# Patient Record
Sex: MALE | Race: WHITE | NOT HISPANIC OR LATINO | Employment: OTHER | URBAN - METROPOLITAN AREA
[De-identification: names, ages, dates, MRNs, and addresses within clinical notes are randomized per-mention and may not be internally consistent; named-entity substitution may affect disease eponyms.]

---

## 2022-08-25 ENCOUNTER — OFFICE VISIT (OUTPATIENT)
Dept: OPHTHALMOLOGY | Facility: CLINIC | Age: 72
End: 2022-08-25
Attending: OPHTHALMOLOGY
Payer: COMMERCIAL

## 2022-08-25 ENCOUNTER — TELEPHONE (OUTPATIENT)
Dept: OPHTHALMOLOGY | Facility: CLINIC | Age: 72
End: 2022-08-25

## 2022-08-25 DIAGNOSIS — H33.20 RETINAL DETACHMENT, UNSPECIFIED LATERALITY: Primary | ICD-10-CM

## 2022-08-25 DIAGNOSIS — H33.029 RHEGMATOGENOUS RETINAL DETACHMENT WITH MULTIPLE BREAKS: Primary | ICD-10-CM

## 2022-08-25 LAB — SARS-COV-2 RNA RESP QL NAA+PROBE: NEGATIVE

## 2022-08-25 PROCEDURE — 92250 FUNDUS PHOTOGRAPHY W/I&R: CPT | Performed by: OPHTHALMOLOGY

## 2022-08-25 PROCEDURE — 92134 CPTRZ OPH DX IMG PST SGM RTA: CPT | Performed by: OPHTHALMOLOGY

## 2022-08-25 PROCEDURE — G0463 HOSPITAL OUTPT CLINIC VISIT: HCPCS | Mod: 25

## 2022-08-25 PROCEDURE — U0005 INFEC AGEN DETEC AMPLI PROBE: HCPCS | Performed by: OPHTHALMOLOGY

## 2022-08-25 PROCEDURE — 99204 OFFICE O/P NEW MOD 45 MIN: CPT | Mod: GC | Performed by: STUDENT IN AN ORGANIZED HEALTH CARE EDUCATION/TRAINING PROGRAM

## 2022-08-25 PROCEDURE — 92134 CPTRZ OPH DX IMG PST SGM RTA: CPT | Mod: 26 | Performed by: STUDENT IN AN ORGANIZED HEALTH CARE EDUCATION/TRAINING PROGRAM

## 2022-08-25 PROCEDURE — 99207 FUNDUS PHOTOS OU (BOTH EYES): CPT | Mod: 26 | Performed by: STUDENT IN AN ORGANIZED HEALTH CARE EDUCATION/TRAINING PROGRAM

## 2022-08-25 RX ORDER — HYDROXYCHLOROQUINE SULFATE 200 MG/1
200 TABLET, FILM COATED ORAL DAILY
COMMUNITY

## 2022-08-25 ASSESSMENT — TONOMETRY
OD_IOP_MMHG: 15
IOP_METHOD: TONOPEN
OS_IOP_MMHG: 14

## 2022-08-25 ASSESSMENT — SLIT LAMP EXAM - LIDS
COMMENTS: NORMAL
COMMENTS: NORMAL

## 2022-08-25 ASSESSMENT — EXTERNAL EXAM - LEFT EYE: OS_EXAM: NORMAL

## 2022-08-25 ASSESSMENT — EXTERNAL EXAM - RIGHT EYE: OD_EXAM: NORMAL

## 2022-08-25 ASSESSMENT — CONF VISUAL FIELD
OD_SUPERIOR_NASAL_RESTRICTION: 1
OS_NORMAL: 1

## 2022-08-25 ASSESSMENT — VISUAL ACUITY
OS_PH_SC: 20/100
METHOD: SNELLEN - LINEAR
OD_SC: 20/25
OD_SC+: -1
OS_SC: 20/400

## 2022-08-25 ASSESSMENT — CUP TO DISC RATIO
OD_RATIO: 0.2
OS_RATIO: 0.2

## 2022-08-25 NOTE — TELEPHONE ENCOUNTER
Met with Kwaku to arrange an emergency surgery for tomorrow 08/26 with Dr. Qing Shaw. OR space is saved for Dr. Qing Shaw if we can arrange a ride for Kwaku and he will stay with his mother Peyton Calloway following surgery.     I will call Kwaku on cell phone provided [phone], once ride is confirmed. Kwaku gave permission to share necessary information to procure transport to and from surgery. Kwaku also provided address in [place] as well as his mother's home number where he'll be staying.     [address] [place] [address]    [phone] mother's home number

## 2022-08-25 NOTE — H&P (VIEW-ONLY)
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 Dr. 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   Left eye has cataract, vision stable. He needs glasses but uses the eye for near.     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 Avoided ER visit.   - Risk factors: prior HST after CE/IOL in 2020, myopia, lattice, pseudophakia. Denies trauma.   - Exam significant for RRD from 6:00 to 9:00 with a break at 10:00, 9:00 and 7:00; old tear with laser scars. OCT consistent. Mac is involved inferotemporally. Fovea still attached. [facility]    Plan  -plan for 25gPPV/EL/AFx/Gas + buckle   - COVID stat   - POD1, POW1, POM1 then [facility]up with [facility] thereafter    I discussed the risks, benefits, and alternatives of retinal detachment surgery with the patient.  I explained that with surgery there was approximately a 70 to 80 percent chance of success and that the surgery might fail due to formation of fibrosis or other postoperative problems.  I explained that if the surgery failed, additional surgeries might be needed. I explained that retinal detachments cause vision loss and that even with a successful result from the surgery, the vision might not return to its prior level.  I explained that if there were subsequent re-detachments, even more vision might be lost.  I  explained that with a gas bubble in the eye, a particular head position after surgery including face-down might be necessary for a week or more.  I also explained that airplane travel or high altitudes would have to be avoided for up to three months after surgery.      I explained that an oil bubble could be placed instead, and that it would not require such strict positioning or travel restrictions. However, an oil bubble would require further surgeries to be removed.    I explained that with a scleral buckle around the eye, double vision may develop after surgery.  This double vision usually resolves, but it might require either special glasses, further surgeries, or could even be intractable and permanent.  I explained there was a small chance I might have to remove their intraocular lens during the surgery.       I explained that this is a training facility, and residents or fellows might participate in the surgery under supervision.    The patient understood the risks, benefits, and alternatives, and elected to proceed.      # ERM, right eye  - NVS, mild    # Pseudophakia, right eye  - IOL good, PCO  - [facility]    # NS, left eye  # Myope  - BCVA closer to 20/20, effectively doing monovision with left eye for near work    # Lattice both eyes  Retina detachment precautions were discussed with the patient (presence or increased in flashes, floaters or a curtain in the visual field) and was asked to return if any of the those occur     RTC: Return Emergent RD repair.            ~~~~~~~~~~~~~~~~~~~~~~~~~~~~~~~~~~  My privilege to be part of your care,  Dat Yu MD, MSc  Ophthalmology PGY-3 resident physician  Pager: [phone]     Complete documentation of historical and exam elements from today's encounter can be found in the full encounter summary report (not reduplicated in this progress note).  I personally obtained the chief complaint(s) and history of present illness.  I confirmed and edited as  necessary the review of systems, past medical/surgical history, family history, social history, and examination findings as documented by others; and I examined the patient myself.  I personally reviewed the relevant tests, images, and reports as documented above.  I personally reviewed the ophthalmic test(s) associated with this encounter, agree with the interpretation(s) as documented by the resident/fellow, and have edited the corresponding report(s) as necessary.   I formulated and edited as necessary the assessment and plan and discussed the findings and management plan with the patient and family      Qing Shaw MD  Professor of Ophthalmology.   Vitreo-retinal surgeon   Department of Ophthalmology and Visual Neurosciences   [facility]  Phone: [phone]   Fax: [phone]

## 2022-08-25 NOTE — NURSING NOTE
Chief Complaints and History of Present Illnesses   Patient presents with     Retinal Detachment Evaluation     Pt here for Mac- On RD right eye, referred by Dr Elliot Israel with [facility].      Chief Complaint(s) and History of Present Illness(es)     Retinal Detachment Evaluation     Associated symptoms: floaters, shade, eye pain and blurred vision    Comments: Pt here for Mac- On RD right eye, referred by Dr Elliot Israel with [facility].               Comments     Pt noted floaters August 19 th- then noted shade Saturday. Pt has hx of RD right eye 2 years ago with Pexy. Pt is also pseudophakic right eye. Pt does not want slip lamp examination left eye as he feels that is what caused his RD in the right eye.    Pt using:  Refresh Plus PRN each eye   GARRISON AGARWAL 2:21 PM August 25, 2022

## 2022-08-25 NOTE — PROGRESS NOTES
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 Dr. 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   Left eye has cataract, vision stable. He needs glasses but uses the eye for near.     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 Avoided ER visit.   - Risk factors: prior HST after CE/IOL in 2020, myopia, lattice, pseudophakia. Denies trauma.   - Exam significant for RRD from 6:00 to 9:00 with a break at 10:00, 9:00 and 7:00; old tear with laser scars. OCT consistent. Mac is involved inferotemporally. Fovea still attached. [facility].    Plan  -plan for 25gPPV/EL/AFx/Gas + buckle   - COVID stat   - POD1, POW1, POM1 then [facility] thereafter    I discussed the risks, benefits, and alternatives of retinal detachment surgery with the patient.  I explained that with surgery there was approximately a 70 to 80 percent chance of success and that the surgery might fail due to formation of fibrosis or other postoperative problems.  I explained that if the surgery failed, additional surgeries might be needed. I explained that retinal detachments cause vision loss and that even with a successful result from the surgery, the vision might not return to its prior level.  I explained that if there were subsequent re-detachments, even more vision might be lost.  I  explained that with a gas bubble in the eye, a particular head position after surgery including face-down might be necessary for a week or more.  I also explained that airplane travel or high altitudes would have to be avoided for up to three months after surgery.      I explained that an oil bubble could be placed instead, and that it would not require such strict positioning or travel restrictions. However, an oil bubble would require further surgeries to be removed.    I explained that with a scleral buckle around the eye, double vision may develop after surgery.  This double vision usually resolves, but it might require either special glasses, further surgeries, or could even be intractable and permanent.  I explained there was a small chance I might have to remove their intraocular lens during the surgery.       I explained that this is a training facility, and residents or fellows might participate in the surgery under supervision.    The patient understood the risks, benefits, and alternatives, and elected to proceed.      # ERM, right eye  - NVS, mild    # Pseudophakia, right eye  - IOL good, PCO  - [facility]    # NS, left eye  # Myope  - BCVA closer to 20/20, effectively doing monovision with left eye for near work    # Lattice both eyes  Retina detachment precautions were discussed with the patient (presence or increased in flashes, floaters or a curtain in the visual field) and was asked to return if any of the those occur     RTC: Return Emergent RD repair.            ~~~~~~~~~~~~~~~~~~~~~~~~~~~~~~~~~~  My privilege to be part of your care,  Dat Yu MD, MSc  Ophthalmology PGY-3 resident physician  Pager: [phone]     Complete documentation of historical and exam elements from today's encounter can be found in the full encounter summary report (not reduplicated in this progress note).  I personally obtained the chief complaint(s) and history of present illness.  I confirmed and edited as  necessary the review of systems, past medical/surgical history, family history, social history, and examination findings as documented by others; and I examined the patient myself.  I personally reviewed the relevant tests, images, and reports as documented above.  I personally reviewed the ophthalmic test(s) associated with this encounter, agree with the interpretation(s) as documented by the resident/fellow, and have edited the corresponding report(s) as necessary.   I formulated and edited as necessary the assessment and plan and discussed the findings and management plan with the patient and family      Qing Shaw MD  Professor of Ophthalmology.   Vitreo-retinal surgeon   Department of Ophthalmology and Visual Neurosciences   [facility]  Phone: [phone]   Fax: [phone]

## 2022-08-25 NOTE — TELEPHONE ENCOUNTER
Kwaku gave verbal consent in clinic for this  to reach out to a contact at [facility] to try to arrange a ride for emergancy surgery tomorrow. [facility] 08/25/22    Left voicemail with callback number [phone] to arrange ride transport for Kwaku to and from surgery tomorrow.

## 2022-08-25 NOTE — PROGRESS NOTES
Pre-Operative H & P     CC:  Preoperative exam to assess for increased cardiopulmonary risk while undergoing surgery and anesthesia.    Date of Encounter: 8/25/2022  Primary Care Physician:  No primary care provider on file.     Reason for visit:   Encounter Diagnosis   Name Primary?     Rhegmatogenous retinal detachment with multiple breaks Yes       HPI  Kwaku Calloway is a 71 year old male who presents for pre-operative H & P in preparation for pars plana vitrectomy and scleral buckle for retinal detachment repair with Dr. Qing Shaw on 8/26/22 at [facility].     History is obtained from the patient and chart review      Hx of abnormal bleeding or anti-platelet use: No    Menstrual history: No LMP for male patient.:     Prior to Admission Medications  Current Outpatient Medications   Medication Sig Dispense Refill     hydroxychloroquine (PLAQUENIL) 200 MG tablet Take 200 mg by mouth daily         Family History  Family History   Problem Relation Age of Onset     Glaucoma No family hx of      Macular Degeneration No family hx of        The complete review of systems is negative other than noted in the HPI or here.     Preprocedure Note          No  note exists          Vitals  98.0F, HR 61, /81, SpO2 98-99%, RR 10-15     Physical Exam  Constitutional: Awake, alert, cooperative, no apparent distress, and appears stated age.  Eyes: See ophthalmology note  HENT: Normocephalic, oral pharynx with moist mucus membranes, good dentition. No goiter appreciated.   Respiratory: Clear to auscultation bilaterally, no crackles or wheezing.  Cardiovascular: Regular rate and rhythm, normal S1 and S2, and no murmur noted.  Carotids +2, no bruits. No edema. Palpable pulses to radial  DP and PT arteries.   GI: Normal bowel sounds, soft, non-distended, non-tender, no masses palpated, no hepatosplenomegaly.   Lymph/Hematologic: No cervical lymphadenopathy and no supraclavicular  lymphadenopathy.  Skin: Warm and dry.  No rashes at anticipated surgical site.   Musculoskeletal: deformity of right MCPs, left MCPs and ulnar deviated ulnarcarpal joints   Neurologic: Awake, alert, oriented to name, place and time. Cranial nerves II-XII are grossly intact. Gait is normal.   Neuropsychiatric: Calm, cooperative. Normal affect.     PRIOR LABS/DIAGNOSTIC STUDIES:   All labs and imaging personally reviewed     EKG/ stress test - if available please see in ROS above   No results found.  No flowsheet data found.          The patient's records and results personally reviewed by this provider.     Outside records reviewed from: care everywhere    LAB/DIAGNOSTIC STUDIES TODAY:    COVID    ASSESSMENT and PLAN    Plan/Recommendations  Pt will be optimized for the proposed procedure.  See below for details on the assessment, risk, and preoperative recommendations     Eye:  - Past ocular history: Pseudophakia right eye, retinal tear s/p pexy, new rhegmatogenous retinal detachment all in right eye  - Moderate to high myopia both eyes  - Left eye cataract    NEUROLOGY  - No h/o CVA, TIA  - Post Op delirium risk factors:  Age     ENT  - No current airway concerns.  Will need to be reassessed day of surgery.  Mallampati: Anesthesia will assess  TM: Anesthesia will assess     CARDIAC  - No h/o CAD/NSTEMI, CHF  - Should be on statin, not currently on one  - No HTN; he was very anxious in office today  - Patient performs 8 METS easily    CHF  Ischemic heart disease  CVA or TIA  DM requiring insulin  Cr >2  Intraperitoneal, intrathoracic, or vascular surgery above the inguinal ligament    0= 0.4%  1= 0.9%  2= 6.6%  3+ 11%  Revised Cardiac Risk Index: 0.4% risk of major adverse cardiac event.        PULMONARY  Snore  Tired  Observed apnea  HTN  BMI >30  Age >50  Neck >40 cm   Male    1-2 Low  3-4 Intermediate  5-8 High  If =/> 3 and HCO3 =/> 28, treat as high risk  CAMMIE risk: Minimal Risk     Total Score: 2      - Denies  asthma or inhaler use  - Tobacco History    Social History     Socioeconomic History     Marital status: Single     Spouse name: None     Number of children: None     Years of education: None     Highest education level: None   Tobacco Use     Smoking status: Never Smoker     Smokeless tobacco: Never Used   Substance and Sexual Activity     Alcohol use: Yes     Drug use: Never           GI  Denies GERD  Hx of PONV  Pre-op opiate use  PONV risk score= 1  (If > 2, anti-emetic intervention is recommended.)  PONV Medium Risk  Total Score: 2    1 AN PONV: Patient is not a current smoker    1 AN PONV: Intended Post Op Opioids         /RENAL  - No CKD     ENDOCRINE    There is no height or weight on file to calculate BMI.  - No history of Diabetes Mellitus or thyroid abnormalities     HEME  - No history of abnormal bleeding or antiplatelet use or anticoagulant use.    Age =/>60 1 point  BMI =/> 40  1 point  Male  2 points    Sepsis/SIRS 3 points  Hx of VTE  3 points  FH of VTE 4 points  Active cancer  5 points    0= 0.26%  1-2= 0.5%  3-5= 1.8%  6-8= 3%  >8= 4.5%  VTE risk: 1.8%       MSK  - Rheumatoid arthritis, right MCPs severely affected > left   - On Plaquenil   - No expected mobility issues for bria          My privilege to be part of your care,  Dat Yu MD, MSc  Ophthalmology PGY-2 resident physician  Pager: [phone]

## 2022-08-26 ENCOUNTER — ANESTHESIA EVENT (OUTPATIENT)
Dept: SURGERY | Facility: AMBULATORY SURGERY CENTER | Age: 72
End: 2022-08-26
Payer: COMMERCIAL

## 2022-08-26 ENCOUNTER — HOSPITAL ENCOUNTER (OUTPATIENT)
Facility: AMBULATORY SURGERY CENTER | Age: 72
Discharge: HOME | End: 2022-08-26
Attending: OPHTHALMOLOGY
Payer: COMMERCIAL

## 2022-08-26 ENCOUNTER — TELEPHONE (OUTPATIENT)
Dept: OPHTHALMOLOGY | Facility: CLINIC | Age: 72
End: 2022-08-26

## 2022-08-26 ENCOUNTER — PREP FOR PROCEDURE (OUTPATIENT)
Dept: OPHTHALMOLOGY | Facility: CLINIC | Age: 72
End: 2022-08-26

## 2022-08-26 ENCOUNTER — ANESTHESIA (OUTPATIENT)
Dept: SURGERY | Facility: AMBULATORY SURGERY CENTER | Age: 72
End: 2022-08-26
Payer: COMMERCIAL

## 2022-08-26 VITALS
BODY MASS INDEX: 21 KG/M2 | SYSTOLIC BLOOD PRESSURE: 108 MMHG | HEIGHT: 71 IN | RESPIRATION RATE: 16 BRPM | WEIGHT: 150 LBS | DIASTOLIC BLOOD PRESSURE: 64 MMHG | OXYGEN SATURATION: 99 % | TEMPERATURE: 97.2 F | HEART RATE: 52 BPM

## 2022-08-26 DIAGNOSIS — H33.001 RHEGMATOGENOUS RETINAL DETACHMENT OF RIGHT EYE: Primary | ICD-10-CM

## 2022-08-26 DIAGNOSIS — Z98.890 POSTOPERATIVE EYE STATE: Primary | ICD-10-CM

## 2022-08-26 DIAGNOSIS — H33.001 RHEGMATOGENOUS RETINAL DETACHMENT OF RIGHT EYE: ICD-10-CM

## 2022-08-26 PROCEDURE — 67108 REPAIR DETACHED RETINA: CPT | Mod: RT | Performed by: OPHTHALMOLOGY

## 2022-08-26 PROCEDURE — 67108 REPAIR DETACHED RETINA: CPT | Mod: RT

## 2022-08-26 DEVICE — EYE IMP SLEEVE OVAL STYLE 3084 S3084: Type: IMPLANTABLE DEVICE | Site: EYE | Status: FUNCTIONAL

## 2022-08-26 DEVICE — EYE IMP STRIP STYLE 4050 S4050: Type: IMPLANTABLE DEVICE | Site: EYE | Status: FUNCTIONAL

## 2022-08-26 RX ORDER — PREDNISOLONE ACETATE 10 MG/ML
1 SUSPENSION/ DROPS OPHTHALMIC 4 TIMES DAILY
Qty: 5 ML | Refills: 3 | Status: SHIPPED | OUTPATIENT
Start: 2022-08-26

## 2022-08-26 RX ORDER — ERYTHROMYCIN 5 MG/G
OINTMENT OPHTHALMIC PRN
Status: DISCONTINUED | OUTPATIENT
Start: 2022-08-26 | End: 2022-08-26 | Stop reason: HOSPADM

## 2022-08-26 RX ORDER — BALANCED SALT SOLUTION 6.4; .75; .48; .3; 3.9; 1.7 MG/ML; MG/ML; MG/ML; MG/ML; MG/ML; MG/ML
SOLUTION OPHTHALMIC PRN
Status: DISCONTINUED | OUTPATIENT
Start: 2022-08-26 | End: 2022-08-26 | Stop reason: HOSPADM

## 2022-08-26 RX ORDER — PROPOFOL 10 MG/ML
INJECTION, EMULSION INTRAVENOUS CONTINUOUS PRN
Status: DISCONTINUED | OUTPATIENT
Start: 2022-08-26 | End: 2022-08-26

## 2022-08-26 RX ORDER — POLYMYXIN B SULFATE AND TRIMETHOPRIM 1; 10000 MG/ML; [USP'U]/ML
SOLUTION OPHTHALMIC PRN
Status: DISCONTINUED | OUTPATIENT
Start: 2022-08-26 | End: 2022-08-26 | Stop reason: HOSPADM

## 2022-08-26 RX ORDER — PROPARACAINE HYDROCHLORIDE 5 MG/ML
1 SOLUTION/ DROPS OPHTHALMIC ONCE
Status: COMPLETED | OUTPATIENT
Start: 2022-08-26 | End: 2022-08-26

## 2022-08-26 RX ORDER — OFLOXACIN 3 MG/ML
1 SOLUTION/ DROPS OPHTHALMIC 4 TIMES DAILY
Qty: 5 ML | Refills: 1 | Status: SHIPPED | OUTPATIENT
Start: 2022-08-26

## 2022-08-26 RX ORDER — PROPOFOL 10 MG/ML
INJECTION, EMULSION INTRAVENOUS PRN
Status: DISCONTINUED | OUTPATIENT
Start: 2022-08-26 | End: 2022-08-26

## 2022-08-26 RX ORDER — CYCLOPENTOLATE HYDROCHLORIDE 10 MG/ML
1 SOLUTION/ DROPS OPHTHALMIC ONCE
Status: CANCELLED | OUTPATIENT
Start: 2022-08-26 | End: 2022-08-26

## 2022-08-26 RX ORDER — PHENYLEPHRINE HYDROCHLORIDE 25 MG/ML
1 SOLUTION/ DROPS OPHTHALMIC ONCE
Status: DISCONTINUED | OUTPATIENT
Start: 2022-08-26 | End: 2022-08-26 | Stop reason: HOSPADM

## 2022-08-26 RX ORDER — PROPARACAINE HYDROCHLORIDE 5 MG/ML
1 SOLUTION/ DROPS OPHTHALMIC ONCE
Status: DISCONTINUED | OUTPATIENT
Start: 2022-08-26 | End: 2022-08-26 | Stop reason: HOSPADM

## 2022-08-26 RX ORDER — DEXAMETHASONE SODIUM PHOSPHATE 4 MG/ML
INJECTION, SOLUTION INTRA-ARTICULAR; INTRALESIONAL; INTRAMUSCULAR; INTRAVENOUS; SOFT TISSUE PRN
Status: DISCONTINUED | OUTPATIENT
Start: 2022-08-26 | End: 2022-08-26 | Stop reason: HOSPADM

## 2022-08-26 RX ORDER — SODIUM CHLORIDE, SODIUM LACTATE, POTASSIUM CHLORIDE, CALCIUM CHLORIDE 600; 310; 30; 20 MG/100ML; MG/100ML; MG/100ML; MG/100ML
INJECTION, SOLUTION INTRAVENOUS CONTINUOUS
Status: DISCONTINUED | OUTPATIENT
Start: 2022-08-26 | End: 2022-08-26 | Stop reason: HOSPADM

## 2022-08-26 RX ORDER — TROPICAMIDE 10 MG/ML
1 SOLUTION/ DROPS OPHTHALMIC ONCE
Status: CANCELLED | OUTPATIENT
Start: 2022-08-26 | End: 2022-08-26

## 2022-08-26 RX ORDER — TROPICAMIDE 10 MG/ML
1 SOLUTION/ DROPS OPHTHALMIC
Status: COMPLETED | OUTPATIENT
Start: 2022-08-26 | End: 2022-08-26

## 2022-08-26 RX ORDER — PHENYLEPHRINE HYDROCHLORIDE 25 MG/ML
1 SOLUTION/ DROPS OPHTHALMIC ONCE
Status: CANCELLED | OUTPATIENT
Start: 2022-08-26 | End: 2022-08-26

## 2022-08-26 RX ORDER — TROPICAMIDE 10 MG/ML
1 SOLUTION/ DROPS OPHTHALMIC ONCE
Status: DISCONTINUED | OUTPATIENT
Start: 2022-08-26 | End: 2022-08-26 | Stop reason: HOSPADM

## 2022-08-26 RX ORDER — CYCLOPENTOLATE HYDROCHLORIDE 10 MG/ML
1 SOLUTION/ DROPS OPHTHALMIC ONCE
Status: COMPLETED | OUTPATIENT
Start: 2022-08-26 | End: 2022-08-26

## 2022-08-26 RX ORDER — PROPARACAINE HYDROCHLORIDE 5 MG/ML
1 SOLUTION/ DROPS OPHTHALMIC ONCE
Status: CANCELLED | OUTPATIENT
Start: 2022-08-26 | End: 2022-08-26

## 2022-08-26 RX ORDER — PHENYLEPHRINE HYDROCHLORIDE 25 MG/ML
1 SOLUTION/ DROPS OPHTHALMIC
Status: COMPLETED | OUTPATIENT
Start: 2022-08-26 | End: 2022-08-26

## 2022-08-26 RX ORDER — FENTANYL CITRATE 50 UG/ML
INJECTION, SOLUTION INTRAMUSCULAR; INTRAVENOUS PRN
Status: DISCONTINUED | OUTPATIENT
Start: 2022-08-26 | End: 2022-08-26

## 2022-08-26 RX ORDER — ATROPINE SULFATE 10 MG/ML
SOLUTION/ DROPS OPHTHALMIC PRN
Status: DISCONTINUED | OUTPATIENT
Start: 2022-08-26 | End: 2022-08-26 | Stop reason: HOSPADM

## 2022-08-26 RX ORDER — BUPIVACAINE HYDROCHLORIDE 7.5 MG/ML
INJECTION, SOLUTION EPIDURAL; RETROBULBAR PRN
Status: DISCONTINUED | OUTPATIENT
Start: 2022-08-26 | End: 2022-08-26 | Stop reason: HOSPADM

## 2022-08-26 RX ADMIN — PROPOFOL 30 MG: 10 INJECTION, EMULSION INTRAVENOUS at 15:32

## 2022-08-26 RX ADMIN — FENTANYL CITRATE 50 MCG: 50 INJECTION, SOLUTION INTRAMUSCULAR; INTRAVENOUS at 13:17

## 2022-08-26 RX ADMIN — PROPOFOL 30 MG: 10 INJECTION, EMULSION INTRAVENOUS at 13:21

## 2022-08-26 RX ADMIN — PHENYLEPHRINE HYDROCHLORIDE 1 DROP: 25 SOLUTION/ DROPS OPHTHALMIC at 12:32

## 2022-08-26 RX ADMIN — FENTANYL CITRATE 25 MCG: 50 INJECTION, SOLUTION INTRAMUSCULAR; INTRAVENOUS at 13:41

## 2022-08-26 RX ADMIN — TROPICAMIDE 1 DROP: 10 SOLUTION/ DROPS OPHTHALMIC at 12:40

## 2022-08-26 RX ADMIN — FENTANYL CITRATE 25 MCG: 50 INJECTION, SOLUTION INTRAMUSCULAR; INTRAVENOUS at 13:59

## 2022-08-26 RX ADMIN — PHENYLEPHRINE HYDROCHLORIDE 1 DROP: 25 SOLUTION/ DROPS OPHTHALMIC at 12:40

## 2022-08-26 RX ADMIN — SODIUM CHLORIDE, SODIUM LACTATE, POTASSIUM CHLORIDE, CALCIUM CHLORIDE: 600; 310; 30; 20 INJECTION, SOLUTION INTRAVENOUS at 12:40

## 2022-08-26 RX ADMIN — CYCLOPENTOLATE HYDROCHLORIDE 1 DROP: 10 SOLUTION/ DROPS OPHTHALMIC at 12:23

## 2022-08-26 RX ADMIN — PHENYLEPHRINE HYDROCHLORIDE 1 DROP: 25 SOLUTION/ DROPS OPHTHALMIC at 12:26

## 2022-08-26 RX ADMIN — PROPOFOL 20 MG: 10 INJECTION, EMULSION INTRAVENOUS at 15:25

## 2022-08-26 RX ADMIN — PROPOFOL 20 MCG/KG/MIN: 10 INJECTION, EMULSION INTRAVENOUS at 14:09

## 2022-08-26 RX ADMIN — PROPOFOL 50 MG: 10 INJECTION, EMULSION INTRAVENOUS at 13:18

## 2022-08-26 RX ADMIN — PROPARACAINE HYDROCHLORIDE 1 DROP: 5 SOLUTION/ DROPS OPHTHALMIC at 12:22

## 2022-08-26 RX ADMIN — TROPICAMIDE 1 DROP: 10 SOLUTION/ DROPS OPHTHALMIC at 12:23

## 2022-08-26 RX ADMIN — TROPICAMIDE 1 DROP: 10 SOLUTION/ DROPS OPHTHALMIC at 12:32

## 2022-08-26 NOTE — TELEPHONE ENCOUNTER
Unable to arrange a ride for the morning of 08/26. Will set up for the afternoon surgery tomorrow morning. Kwaku was agreeable to this plan and we will speak tomorrow.

## 2022-08-26 NOTE — ANESTHESIA PREPROCEDURE EVALUATION
Anesthesia Pre-Procedure Evaluation    Patient: Kwaku Calloway   MRN: [id number] : 1950        Procedure : Procedure(s):  VITRECTOMY, PARS PLANA APPROACH, USING 25-GAUGE INSTRUMENTS, WITH SCLERAL BUCKLING OR RETINAL REATTACHMENT with gas/oil bubble          Past Medical History:   Diagnosis Date     Retinal detachment       Past Surgical History:   Procedure Laterality Date     CATARACT IOL, RT/LT        No Known Allergies   Social History     Tobacco Use     Smoking status: Never Smoker     Smokeless tobacco: Never Used   Substance Use Topics     Alcohol use: Yes     Comment: occasional      Wt Readings from Last 1 Encounters:   22 68 kg (150 lb)        Anesthesia Evaluation            ROS/MED HX  ENT/Pulmonary:  - neg pulmonary ROS     Neurologic:  - neg neurologic ROS     Cardiovascular:  - neg cardiovascular ROS     METS/Exercise Tolerance:     Hematologic:  - neg hematologic  ROS     Musculoskeletal:   (+) arthritis,     GI/Hepatic:  - neg GI/hepatic ROS     Renal/Genitourinary:  - neg Renal ROS     Endo:  - neg endo ROS     Psychiatric/Substance Use:  - neg psychiatric ROS     Infectious Disease:  - neg infectious disease ROS     Malignancy:       Other:               OUTSIDE LABS:  CBC: No results found for: WBC, HGB, HCT, PLT  BMP: No results found for: NA, POTASSIUM, CHLORIDE, CO2, BUN, CR, GLC  COAGS: No results found for: PTT, INR, FIBR  POC: No results found for: BGM, HCG, HCGS  HEPATIC: No results found for: ALBUMIN, PROTTOTAL, ALT, AST, GGT, ALKPHOS, BILITOTAL, BILIDIRECT, LALO  OTHER: No results found for: PH, LACT, A1C, RACHELE, PHOS, MAG, LIPASE, AMYLASE, TSH, T4, T3, CRP, SED    Anesthesia Plan    ASA Status:  2      Anesthesia Type: MAC.     - Reason for MAC: immobility needed              Consents    Anesthesia Plan(s) and associated risks, benefits, and realistic alternatives discussed. Questions answered and patient/representative(s) expressed understanding.     - Discussed: Risks,  Benefits and Alternatives for BOTH SEDATION and the PROCEDURE were discussed     - Discussed with:  Patient      - Extended Intubation/Ventilatory Support Discussed: No.      - Patient is DNR/DNI Status: No    Use of blood products discussed: No .     Postoperative Care    Pain management: IV analgesics, Oral pain medications.        Comments:                Steve Cuba MD, MD

## 2022-08-26 NOTE — PROGRESS NOTES
spoke to patient in pre-op area. Explained r/b/a surgery OD, explained gas would block vision OD for 1-2 months.     - r/b/a d/w patient: vision loss, blindness, infection, bleeding   - retinal detachment, need for more surgeries, need for gas or oil bubble and bubble restrictions   - diplopia, refractive change   - persistent blurriness, distortion, or scotoma   - participation/performance by fellow or resident

## 2022-08-26 NOTE — TELEPHONE ENCOUNTER
Patient is scheduled for surgery with Dr. Lizette Prieto     Spoke with: Kwaku     Date of Surgery: 08/26/22     Location: [facility]:  [address] [place] [id number]     H&P will be completed at: In clinic     COVID testing: In clinic    Post Op scheduled on 08/27, and 1 week     Surgery packet was given in clinic     Additional comments: Advised RN will call 1 - 3 business days prior with arrival time and instructions. Informed patient they will need an adult  and responsible adult to stay with for 24 hours following surgery    This  let Kwaku know his ride has been set up with a local veterans group to cover transportation and I will be contacting him with the [employer] information once a  is assigned.

## 2022-08-26 NOTE — OP NOTE
PRE-OP Dx:   1) Rhegmatogenous retinal detachment, right eye    Post-OP Dx:   1) same    Attending:  Lizette Prieto MD, PhD  Fellow: Irwin Whitehead MD, MPH  Resident: Dat Yu MD, MSc    Anesthesia:  MAC + RB  Procedure:    1) Pars plana vitrectomy (PPV) 25g   2) SBP #4050, Sleeve #3084   3) FGx 15% C3F8    Findings: Inferotemporal RRD with multiple breaks    EBL: minimal  Specimens: none  Complications: none    Procedure Description:    Kwaku Calloway is a AGE: 71 year old patient with a history of right eye RRD.  After informed consent was obtained, he was brought into the OR where monitored anesthesia care was administered.  The eye was then prepped and draped in the usual fashion for ophthalmic surgery. A retrobulbar block was administered with lidocaine and bupivacaine 50:50 mix.     A 360 degree conjunctival peritomy was created and the quadrants cleared with the gonzalez scissors.  The muscles were isolated and looped with 2-0 silk sutures.  Next 5-0 nylon suture was used to create horizontal mattress sutures in each quadrant.  A #4050 band  was then threaded under the muscles and the mattress sutures and secured with a #3084 sleeve in the superonasal quadrant.   The mattress sutures were tied except for the one over the sleeve.    Attention was then turned to the vitrectomy.  Marks were made on the sclera inferotemporally, superotemporally, and superonasally 3.5 mm posterior to the limbus.  The 25g transscleral cannulas were inserted through the sclera using the trocars.  The infusion cannula was connected to the inferonasal cannula and directly visualized to verify it was in the correct location.  A core vitrectomy was performed and periphery was trimmed readily with depression from the scleral buckle.  Multiple breaks were found temporally and superiorly. Traction was removed and all breaks were marked with diathermy.  A drainage retinotomy was made temporally to facilitate removal of all subretinal  fluid.     Next, laser was placed around all marked breaks and the drainage retinotomy inferotemporally. The final buckle sutures were tied and the ends trimmed. The buckle height was verified to be appropriate.     Next a fluid air exchange was done with the soft-tip cannula and the light pipe.  An air-gas exchange was done with 15% C3F8 and the cannulas were removed.  The sclerotomies were sutured as needed and were tight.  The pressure was checked and verified to be appropriate.  The buckle was rinsed with polytrim solution and the silk sutures removed. The sclerotomies were closed with 6-0 plain suture. The conjunctiva was closed with 6-0 plain suture.  A drop of atropine, trusopt, timolol, and alphagan was placed in the eye with erythromycin ointment.  A pad and ward shield were taped over the eye.    The patient left the OR with no complications.    I was present for the entire surgery.  Lizette Prieto MD    Implant Name Type Inv. Item Serial No.  Lot No. LRB No. Used Action   EYE IMP STRIP KENISHA [id number]  - [id number] Lens/Eye Implant EYE IMP STRIP KENISHA [id number]   [facility] [id number] Right 1 Implanted   EYE IMP FASTENER SLEEVE OVAL  - [id number] Lens/Eye Implant EYE IMP FASTENER SLEEVE OVAL   [facility] [id number] Right 1 Implanted

## 2022-08-26 NOTE — BRIEF OP NOTE
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  Complications: None.  Implants:   Implant Name Type Inv. Item Serial No.  Lot No. LRB No. Used Action   EYE IMP STRIP KENISHA [id number]  - [id number] Lens/Eye Implant EYE IMP STRIP KENISHA [id number]   [place] [employer] [id number] Right 1 Implanted   EYE IMP FASTENER SLEEVE OVAL  - [id number] Lens/Eye Implant EYE IMP FASTENER SLEEVE OVAL   [place] [employer] [id number] Right 1 Implanted

## 2022-08-26 NOTE — ANESTHESIA POSTPROCEDURE EVALUATION
Patient: Kwaku Calolway    Procedure: Procedure(s):  VITRECTOMY, PARS PLANA APPROACH, USING 25-GAUGE INSTRUMENTS, WITH SCLERAL BUCKLING, ENDOLASER,  RETINAL REATTACHMENT with C3F8 15% gas bubble       Anesthesia Type:  MAC    Note:  Disposition: Outpatient   Postop Pain Control: Uneventful            Sign Out: Well controlled pain   PONV: No   Neuro/Psych: Uneventful            Sign Out: Acceptable/Baseline neuro status   Airway/Respiratory: Uneventful            Sign Out: Acceptable/Baseline resp. status   CV/Hemodynamics: Uneventful            Sign Out: Acceptable CV status; No obvious hypovolemia; No obvious fluid overload   Other NRE: NONE   DID A NON-ROUTINE EVENT OCCUR? No           Last vitals:  Vitals Value Taken Time   /71 08/26/22 1556   Temp 36.2  C (97.2  F) 08/26/22 1556   Pulse 55 08/26/22 1556   Resp 16 08/26/22 1556   SpO2 98 % 08/26/22 1556       Electronically Signed By: Steve Cuba MD, MD  August 26, 2022  4:03 PM

## 2022-08-26 NOTE — ANESTHESIA CARE TRANSFER NOTE
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    Anesthesia Type:   MAC     Note:    Oropharynx: spontaneously breathing  Level of Consciousness: awake  Oxygen Supplementation: room air    Independent Airway: airway patency satisfactory and stable  Dentition: dentition unchanged  Vital Signs Stable: post-procedure vital signs reviewed and stable  Report to RN Given: handoff report given  Patient transferred to: Phase II    Handoff Report: Identifed the Patient, Identified the Reponsible Provider, Reviewed the pertinent medical history, Discussed the surgical course, Reviewed Intra-OP anesthesia mangement and issues during anesthesia, Set expectations for post-procedure period and Allowed opportunity for questions and acknowledgement of understanding      Vitals:  Vitals Value Taken Time   BP     Temp     Pulse     Resp     SpO2         Electronically Signed By: MONICO Maurer CRNA  August 26, 2022  3:53 PM

## 2022-08-26 NOTE — DISCHARGE INSTRUCTIONS
POST-OPERATIVE INSTRUCTIONS FOLLOWING SURGERY    Lizette Prieto MD, PhD  Department of Ophthalmology  [facility]  [phone]    FOLLOW UP:  You have a follow up appointment tomorrow, Saturday 08/27/2022 at 10:30 AM at the Eye Clinic in the Clinics and Surgery Center (where you got your surgery) except on the 4th floor of [address] [place] [address]; [place], [place].    EYE DROPS  Drops will be given to you after the surgery.  They DO NOT need to be used until after you are seen in clinic.  DO NOT disturb your bandage the first night.      When using more than one drop, separate them by 3 minutes between drops.  Common times to place drops are breakfast, lunch, dinner, and bedtime.  Do not stop your drops without discussing with our office.  If you run out before your appointment, call and we will send in a refill.    Ofloxacin (tan top) --- 4 times per day  Pred Forte (prednisolone acetate) --- 4 times per day    HEAD POSITIONING  You have a bubble of gas in your eye that we placed at the end of your surgery. You can do a lot to help your eye heal if you can position your head correctly. This is extremely important.    Position: FACE DOWN and do NOT LAY FLAT (lie with pillows to keep your head and back at about a 30 degree angle)    Maintain this positioning 55 minutes of every hour until we see you tomorrow. You will likely need to keep this positioning for at least the first 3 days.    When positioning, it is OK to take brief breaks to eat, stretch, clean up, etc.  Try to position for 90% of the time (5 minute break per hour on average).  DO NOT lay flat on your back until the bubble is gone. This may take 1 month. Lay with your face down at an angle of 30 degrees. Use pillows to prop your back and head at 30 degree angle.   Do not fly on an airplane or travel to elevations more than 1000 feet above [place] until the bubble is gone.  Keep the green bracelet on your wrist until the bubble is gone.   "If it breaks, we can give you a replacement    Sleeping face down can be challenging.  One method is to sleep on your stomach with your head hanging over the edge of the bed with a chair there to rest your head on.  Alternatively, you can sleep with your chest laying on top of a large pile of pillows with your head hanging over.  Alternatively, you can place 2 rows of firm pillows side by side with a gap in between.  Sleep on top of the pillows with your head in the gap.  Alternatively, it is also possible to rent positioning equipment from medical supply stores. This typically costs $150-200 per week. Insurance usually does not cover the cost.  Often, patients prefer the pillows they set up themselves to the rented equipment.      ACTIVITY:  No heavy lifting after surgery. Please do not lift more than 10 lbs for at least 2 weeks.   Keep the bandage in place until you are seen tomorrow   Do not get your bandage wet      PAIN MEDICATION   It is common to have some mild or moderate discomfort after eye surgery.  Tylenol or ibuprofen may be taken if you don't have any other general health conditions that prevent you from taking these.      WHAT TO EXPECT  It is common for the eye to to have a blood tinged discharge for a few days after surgery  It may feel irritated (as if something were in your eye), for there to be clear discharge (thicker in the mornings upon awakening), and for it to be bloodshot for 2-3 weeks following retina surgery.     Your vision is also commonly decreased during this time due to the bubble.      WHAT TO WATCH OUT FOR  If you experience any of the following \"RSVP Symptoms\", you should call immediately:  Worsening Redness  Worsening Sensitivity to light  Worsening Vision, including new flashing lights or floaters  Worsening Pain, including nausea/vomiting    For any of the symptoms listed above, or for other concerns, call [phone] and ask to speak to the clinic nurse.  If you call after " hours, follow to prompts to reach the doctor on call.    [facility]  Home Care Following Anesthesia  For 24 hours after surgery:  Get plenty of rest.  A responsible adult must stay with you for at least 24 hours after you leave the surgery center.  Do not drive or use heavy equipment.  If you have weakness or tingling, don't drive or use heavy equipment until this feeling goes away.   Do not drink alcohol.   Avoid strenuous or risky activities.  Ask for help when climbing stairs.  You may feel lightheaded.  IF so, sit for a few minutes before standing.  Have someone help you get up.   If you have nausea (feel sick to your stomach): Drink only clear liquids such as apple juice, ginger ale, broth or 7-Up.  Rest may also help.  Be sure to drink enough fluids.  Move to a regular diet as you feel able.   You may have a slight fever.  Call the doctor if your fever is over 100 F (37.7 C) (taken under the tongue) or lasts longer than 24 hours.  You may have a dry mouth, a sore throat, muscle aches or trouble sleeping. These should go away after 24 hours.  Do not make important or legal decisions.   It is recommended to avoid smoking.               Tips for taking pain medications  To get the best pain relief possible, remember these points:  Take pain medications as directed, before pain becomes severe.  Pain medication can upset your stomach: taking it with food may help.  Constipation is a common side effect of pain medication. Drink plenty of  fluids.  Eat foods high in fiber. Take a stool softener if recommended by your doctor or pharmacist.  Do not drink alcohol, drive or operate machinery while taking pain medications.  Ask about other ways to control pain, such as with heat, ice or relaxation.    Tylenol/Acetaminophen Consumption  To help encourage the safe use of acetaminophen, the makers of TYLENOL  have lowered the maximum daily dose for single-ingredient Extra Strength  TYLENOL  (acetaminophen) products sold in the [place] from 8 pills per day (4,000 mg) to 6 pills per day (3,000 mg). The dosing interval has also changed from 2 pills every 4-6 hours to 2 pills every 6 hours.  If you feel your pain relief is insufficient, you may take Tylenol/Acetaminophen in addition to your narcotic pain medication.   Be careful not to exceed 3,000 mg of Tylenol/Acetaminophen in a 24 hour period from all sources.  If you are taking extra strength Tylenol/acetaminophen (500 mg), the maximum dose is 6 tablets in 24 hours.  If you are taking regular strength acetaminophen (325 mg), the maximum dose is 9 tablets in 24 hours.    Call a doctor for any of the following:  Signs of infection (fever, growing tenderness at the surgery site, a large amount of drainage or bleeding, severe pain, foul-smelling drainage, redness, swelling).  It has been over 8 to 10 hours since surgery and you are still not able to urinate (pass water).  Headache for over 24 hours.  Numbness, tingling or weakness the day after surgery (if you had spinal anesthesia).  Signs of Covid-19 infection (temperature over 100 degrees, shortness of breath, cough, loss of taste/smell, generalized body aches, persistent headache, chills, sore throat, nausea/vomiting/diarrhea)  Your doctor is:  Dr. Lizette Prieto: Opthalmology: [phone]  Or dial [phone] and ask for the resident on call for:  Ophthalmology  For emergency care, call the:  [place] [facility]:  [phone] (TTY for hearing impaired: [phone])

## 2022-08-27 ENCOUNTER — OFFICE VISIT (OUTPATIENT)
Dept: OPHTHALMOLOGY | Facility: CLINIC | Age: 72
End: 2022-08-27

## 2022-08-27 DIAGNOSIS — H33.001 RHEGMATOGENOUS RETINAL DETACHMENT OF RIGHT EYE: ICD-10-CM

## 2022-08-27 DIAGNOSIS — Z98.890 POSTOPERATIVE EYE STATE: Primary | ICD-10-CM

## 2022-08-27 PROCEDURE — 99207 PR SERVICE NOT STAFFED W/SUPERV PROV: CPT | Performed by: STUDENT IN AN ORGANIZED HEALTH CARE EDUCATION/TRAINING PROGRAM

## 2022-08-27 ASSESSMENT — VISUAL ACUITY
OD_SC: CF@1FT
OS_CC+: -2
OS_CC: 20/50
METHOD: SNELLEN - LINEAR

## 2022-08-27 ASSESSMENT — TONOMETRY
IOP_METHOD: TONOPEN
OD_IOP_MMHG: 16
OS_IOP_MMHG: 14

## 2022-08-27 ASSESSMENT — SLIT LAMP EXAM - LIDS
COMMENTS: NORMAL
COMMENTS: NORMAL

## 2022-08-27 ASSESSMENT — EXTERNAL EXAM - RIGHT EYE: OD_EXAM: NORMAL

## 2022-08-27 ASSESSMENT — CUP TO DISC RATIO: OD_RATIO: 0.2

## 2022-08-27 ASSESSMENT — EXTERNAL EXAM - LEFT EYE: OS_EXAM: NORMAL

## 2022-08-27 NOTE — PATIENT INSTRUCTIONS
POST-OPERATIVE INSTRUCTIONS FOLLOWING RETINA SURGERY    Lizette Prieto MD, PhD  Department of Ophthalmology  [facility]  [phone]      ACTIVITY:  No heavy lifting for 2 weeks after surgery.  No swimming for 3 weeks after surgery.  It is OK for you to shower, to wash your face, and to wash your hair.  Allow the shower to hit the top of your head and wash down your face.  Do not hit your eye directly with the jet from the shower.  Keep your eye covered with the shield when you sleep for 1 week after surgery.  If your shield has a tab, it is designed to go over the bridge of your nose.  Place one piece of tape diagonally from the center of your forehead to the side of your cheek to secure the shield.   During the daytime, you can use either the shield or your regular eyeglasses or sunglasses to protect your eye.    GAS BUBBLE POSITIONING REQUIREMENTS  Positioning requirements will be discussed with you if you have an oil or gas bubble in your eye:    Position:    Face Down    Maintain this positioning for 7 days.    When positioning, it is OK to take brief breaks to eat, stretch, clean up, etc.  Try to position for 90% of the time (5 minute break per hour on average).  Do not lay flat on your back until the bubble is gone.  Do not fly on an airplane or travel to elevations more than 1000 feet above [place] until the bubble is gone.  Keep the green bracelet on your wrist until the bubble is gone.  If it breaks, we can give you a replacement      EYE DROPS  Use these drops after surgery.  When using more than one drop, separate them by 3 minutes between drops.  Common times to place drops are breakfast, lunch, dinner, and bedtime.  Do not stop your drops without discussing with our office.  If you run out before your appointment, call and we will send in a refill.      Ofloxacin --- 4 times per day  Pred Forte (prednisolone acetate) --- 4 times per day    WHAT TO EXPECT  It is common for the eye  to to have a blood tinged discharge for a few days after surgery  It may feel irritated (as if something were in your eye), for there to be clear discharge (thicker in the mornings upon awakening), and for it to be bloodshot for 2-3 weeks following retina surgery.   Your vision is also commonly decreased during this time due to the bubble.          WHAT TO WATCH OUT FOR  If you experience any of the following, you should call immediately:  Increasing pain  Increasing nausea or vomiting  Increasing redness  Worsening or darkening of the vision  New flashing lights or floaters      For any of the symptoms listed above, or for other concerns, call [phone] and ask to speak to the clinic nurse.  If you call after hours, follow to prompts to reach the doctor on call.

## 2022-08-27 NOTE — NURSING NOTE
"Chief Complaints and History of Present Illnesses   Patient presents with     Post Op (Ophthalmology) Right Eye     Pt here for 1 day s/p 25gPPV/EL/AFx/Gas + buckle.      Chief Complaint(s) and History of Present Illness(es)     Post Op (Ophthalmology) Right Eye     Laterality: right eye    Associated symptoms: nausea.  Negative for eye pain, headache, flashes and floaters    Comments: Pt here for 1 day s/p 25gPPV/EL/AFx/Gas + buckle.               Comments     Pt notes \"twinkling sparkles\". Pt has mild soreness. Pt also has mild nausea.   Pt using:  Ofloxacin  Prednisolone   GARRISON AGARWAL 10:50 AM August 27, 2022                   "

## 2022-08-27 NOTE — PROGRESS NOTES
CC: POD#1 s/p 25gPPV/EL/15%C3F8/SBP for mac-on RD right eye 08/26/2022    Interval: Was able to lie with face down and keep his head propped up. Comfortable. Vision is very blurry. Reports some sparkles inferiorly over night.     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 Dr. 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   Left eye has cataract, vision stable. He needs glasses but uses the eye for near.     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 with sx onset 08/19/2022; was 20/25 up to 08/26/2022 pre-op  - Retina flat, attached  - IOP good  - No infection  - VA acceptable for gas and inflammation    - Post-op drops: PredForte QID, Ofloxacin QID  - Face down for 1 week if possible; can lay left side down to sleep if possible  - Shield  - Activity restrictions; do's and don't reviewed    # ERM, right eye  - NVS, mild    # Pseudophakia, right eye  - IOL good, PCO  - Follows with [facility]    # NS, left eye  # Myope  - BCVA closer to 20/20, effectively doing monovision with left eye for near work    # Lattice both eyes  Retina detachment precautions were discussed with the patient (presence or increased in flashes, floaters or a curtain in the visual field) and was asked to return if any of the those occur     RTC: 9/1/22 POW1; DFE OD    ~~~~~~~~~~ ~~~~~~~~~~~~~~~~~~~~~~~~  Irwin Whitehead MD MPH  Vitreoretinal Fellow PGY-5  [facility]

## 2022-08-29 ENCOUNTER — TRANSCRIBE ORDERS (OUTPATIENT)
Dept: OTHER | Age: 72
End: 2022-08-29

## 2022-08-29 DIAGNOSIS — H33.8 OTHER RETINAL DETACHMENTS: Primary | ICD-10-CM

## 2022-08-30 ENCOUNTER — TELEPHONE (OUTPATIENT)
Dept: OPHTHALMOLOGY | Facility: CLINIC | Age: 72
End: 2022-08-30

## 2022-08-30 NOTE — TELEPHONE ENCOUNTER
Pt calling with questions regarding activity/sleep and review of current vision    Pt states not able to see yet and wondering if bubble should be gone    Reviewed bubble may take 6 weeks or more to absorb and as time goes on should see line in vision go down.    Reviewed to keep head down for a week/ or follow up on Thursday and ok to sleep with left side down.    Reviewed to avoid heavy lifting/bending and lifting/jarring activity for about a month.    Dr. Shaw with review restrictions at visit on Thursday    Pt states still able to count fingers, but vision still blurry.    Offered appt tomorrow and pt states not able to obtain transportation and will keep appt on Thursday as scheduled    Pt aware to contact clinic for any worsening symptoms/vision changes    Pt seemed comfortable with information/plan    Marko Reddy RN 3:31 PM 08/30/22

## 2022-09-01 ENCOUNTER — OFFICE VISIT (OUTPATIENT)
Dept: OPHTHALMOLOGY | Facility: CLINIC | Age: 72
End: 2022-09-01
Attending: OPHTHALMOLOGY
Payer: COMMERCIAL

## 2022-09-01 DIAGNOSIS — H33.8 OTHER RETINAL DETACHMENTS: ICD-10-CM

## 2022-09-01 PROCEDURE — G0463 HOSPITAL OUTPT CLINIC VISIT: HCPCS

## 2022-09-01 PROCEDURE — 99024 POSTOP FOLLOW-UP VISIT: CPT | Performed by: OPHTHALMOLOGY

## 2022-09-01 RX ORDER — SPIRONOLACTONE 25 MG
TABLET ORAL DAILY
COMMUNITY

## 2022-09-01 ASSESSMENT — EXTERNAL EXAM - LEFT EYE: OS_EXAM: NORMAL

## 2022-09-01 ASSESSMENT — CUP TO DISC RATIO: OD_RATIO: 0.2

## 2022-09-01 ASSESSMENT — EXTERNAL EXAM - RIGHT EYE: OD_EXAM: NORMAL

## 2022-09-01 ASSESSMENT — VISUAL ACUITY
OS_CC: 20/80
OS_PH_CC: 20/50
OD_SC: CF @ 1'
OS_CC+: -1
METHOD: SNELLEN - LINEAR
OS_PH_CC+: -2

## 2022-09-01 ASSESSMENT — TONOMETRY
OD_IOP_MMHG: 11
OS_IOP_MMHG: 9
IOP_METHOD: TONOPEN

## 2022-09-01 ASSESSMENT — SLIT LAMP EXAM - LIDS
COMMENTS: NORMAL
COMMENTS: NORMAL

## 2022-09-01 NOTE — PATIENT INSTRUCTIONS
- Post-op drops:   - PredForte (white top) Three times a day x 1 week, then twice a day x 1 week and then once a day till finish  - Ofloxacin (tan top) four times a day  X 5 days and stop  - position : left side down to sleep   - Shield at night  - glasses during the day  - Activity restrictions; do's and don't reviewed  - Retina detachment precautions were discussed with the patient (presence or increased in flashes, floaters or a curtain in the visual field) and was asked to return if any of the those occur

## 2022-09-01 NOTE — LETTER
September 1, 2022      Re: Kwaku Calloway   1950    To Whom It May Concern:    This is to confirm that the above patient was seen on 9/1/2022.  Kwaku Calloway is unable to do yard work due to eye surgery, done 08/26/2022. He will be reevaluated in 4 weeks.    Thank you for your cooperation in this matter.  Please do not hesitate to contact me if you have any further questions.    Sincerely,    Electronically signed  ALISHA BARBOZA

## 2022-09-01 NOTE — NURSING NOTE
Chief Complaints and History of Present Illnesses   Patient presents with     Post Op (Ophthalmology) Right Eye     Chief Complaint(s) and History of Present Illness(es)     Post Op (Ophthalmology) Right Eye     Laterality: right eye    Frequency: constantly    Associated symptoms: Negative for eye pain    Pain scale: 0/10              Comments     One week post RE vitrectomy. Kwaku states vision is about the same as last visit RE.     Jett Stallings COT 1:00 PM September 1, 2022

## 2022-09-01 NOTE — PROGRESS NOTES
CC: POD#1 s/p 25gPPV/EL/15%C3F8/SBP for mac-on RD right eye 08/26/2022    Interval: Was able to lie with face down and keep his head propped up. Comfortable. Vision is very blurry. Reports some sparkles inferiorly over night.     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 Dr. 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   Left eye has cataract, vision stable. He needs glasses but uses the eye for near.     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 with sx onset 08/19/2022; was 20/25 up to 08/26/2022 pre-op  - Retina flat, attached  - IOP good  - No infection  - VA acceptable for gas    PLAN   - Post-op drops: PredForte Three times a day x 1 week, then twice a day x 1 week and then once a day till finish  - Ofloxacin four times a day  X 5 days and stop  - position : left side down to sleep   - Shield  - Activity restrictions; do's and don't reviewed  - Retina detachment precautions were discussed with the patient (presence or increased in flashes, floaters or a curtain in the visual field) and was asked to return if any of the those occur     # ERM, right eye  - NVS, mild    # Pseudophakia, right eye  - IOL good, PCO  - Follows with [facility]    # NS, left eye  # Myope  - BCVA closer to 20/20, effectively doing monovision with left eye for near work    # Lattice both eyes      RTC: 2-3 weeks with dr. Prieto     Retina detachment precautions were discussed with the patient (presence or increased in flashes, floaters or a curtain in the visual field) and was asked to return if any of the those occur     ~~~~~~~~~~~~~~~~~~~~~~~~~~~~~~~~~~   Complete documentation of historical and exam elements from today's encounter can be found in the full encounter summary report (not reduplicated in this  progress note).  I personally obtained the chief complaint(s) and history of present illness.  I confirmed and edited as necessary the review of systems, past medical/surgical history, family history, social history, and examination findings as documented by others; and I examined the patient myself.  I personally reviewed the relevant tests, images, and reports as documented above.  I formulated and edited as necessary the assessment and plan and discussed the findings and management plan with the patient and family    Qing Shaw MD  Professor of Ophthalmology  Vitreo-Retinal surgeon   Department of Ophthalmology and Visual Neurosciences   [facility]  Phone: [phone]   Fax: [phone]

## 2022-09-20 DIAGNOSIS — H33.001 RHEGMATOGENOUS RETINAL DETACHMENT OF RIGHT EYE: Primary | ICD-10-CM

## 2022-09-27 ENCOUNTER — OFFICE VISIT (OUTPATIENT)
Dept: OPHTHALMOLOGY | Facility: CLINIC | Age: 72
End: 2022-09-27
Attending: OPHTHALMOLOGY
Payer: COMMERCIAL

## 2022-09-27 DIAGNOSIS — H35.351 CYSTOID MACULAR EDEMA OF RIGHT EYE: Primary | ICD-10-CM

## 2022-09-27 DIAGNOSIS — H33.001 RHEGMATOGENOUS RETINAL DETACHMENT OF RIGHT EYE: ICD-10-CM

## 2022-09-27 PROCEDURE — 99207 OCT RETINA SPECTRALIS OU (BOTH EYE): CPT | Mod: 26 | Performed by: OPHTHALMOLOGY

## 2022-09-27 PROCEDURE — 99207 FUNDUS PHOTOS OU (BOTH EYES): CPT | Mod: 26 | Performed by: OPHTHALMOLOGY

## 2022-09-27 PROCEDURE — G0463 HOSPITAL OUTPT CLINIC VISIT: HCPCS | Mod: 25

## 2022-09-27 PROCEDURE — 99024 POSTOP FOLLOW-UP VISIT: CPT | Mod: GC | Performed by: OPHTHALMOLOGY

## 2022-09-27 PROCEDURE — 92134 CPTRZ OPH DX IMG PST SGM RTA: CPT | Performed by: OPHTHALMOLOGY

## 2022-09-27 PROCEDURE — 92250 FUNDUS PHOTOGRAPHY W/I&R: CPT | Performed by: OPHTHALMOLOGY

## 2022-09-27 RX ORDER — KETOROLAC TROMETHAMINE 5 MG/ML
1 SOLUTION OPHTHALMIC 2 TIMES DAILY
Qty: 5 ML | Refills: 3 | Status: SHIPPED | OUTPATIENT
Start: 2022-09-27

## 2022-09-27 ASSESSMENT — SLIT LAMP EXAM - LIDS
COMMENTS: NORMAL
COMMENTS: NORMAL

## 2022-09-27 ASSESSMENT — VISUAL ACUITY
OS_SC: 20/70
METHOD: SNELLEN - LINEAR
OD_PH_SC+: +1
OS_PH_SC+: +3
OD_SC: 20/100
OS_PH_SC: 20/50
OD_PH_SC: 20/70

## 2022-09-27 ASSESSMENT — TONOMETRY
IOP_METHOD: TONOPEN
OD_IOP_MMHG: 12
OS_IOP_MMHG: 10

## 2022-09-27 ASSESSMENT — CONF VISUAL FIELD
OD_INFERIOR_NASAL_RESTRICTION: 1
METHOD: COUNTING FINGERS
OS_NORMAL: 1

## 2022-09-27 ASSESSMENT — EXTERNAL EXAM - RIGHT EYE: OD_EXAM: NORMAL

## 2022-09-27 ASSESSMENT — EXTERNAL EXAM - LEFT EYE: OS_EXAM: NORMAL

## 2022-09-27 ASSESSMENT — CUP TO DISC RATIO: OD_RATIO: 0.2

## 2022-09-27 NOTE — NURSING NOTE
Chief Complaints and History of Present Illnesses   Patient presents with     Post Op (Ophthalmology) Right Eye     1 month post op of 25gPPV/EL/15%C3F8/SBP for mac-on RD right eye 08/26/2022.      Chief Complaint(s) and History of Present Illness(es)     Post Op (Ophthalmology) Right Eye     Laterality: right eye    Associated symptoms: headache and floaters.  Negative for eye pain and flashes    Treatments tried: eye drops    Pain scale: 0/10    Comments: 1 month post op of 25gPPV/EL/15%C3F8/SBP for mac-on RD right eye 08/26/2022.              Comments     Seeing black dots in vision right eye.  Denies flashes of light.  Concerned that bubble is still in vision.  Put tape on right lens as vision is making him dizzy.  Redness on inner corner of right eye.  Still not taking plaquenil since 8/26/2022.  Eye meds: Prednisolone right eye   BRADLEY Brand 9/27/2022 12:27 PM

## 2022-09-27 NOTE — PROGRESS NOTES
CC -   Post Op OD RD repair    INTERVAL HISTORY -  POM #1 OD s/p PPV/SBP/C3F8 OD 8/26/22 for mac-off RRD. Still noticing bubbles.     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  I personally obtained the chief complaint(s) and history of present illness.  I confirmed and edited as necessary the review of systems, past medical/surgical history, family history, social history, and examination findings as documented by others; and I examined the patient myself.  I personally reviewed the relevant tests, images, and reports as documented above.  I personally reviewed the ophthalmic test(s) associated with this encounter, agree with the interpretation(s) as documented by the resident/fellow, and have edited the corresponding report(s) as necessary.   I formulated and edited as necessary the assessment and plan and discussed the findings and management plan with the patient and family    Lizette  MD Ida, PhD  , Vitreoretinal Surgery  Department of Ophthalmology  [facility]

## 2022-10-11 ENCOUNTER — TELEPHONE (OUTPATIENT)
Dept: OPHTHALMOLOGY | Facility: CLINIC | Age: 72
End: 2022-10-11

## 2022-10-20 ENCOUNTER — TELEPHONE (OUTPATIENT)
Dept: OPHTHALMOLOGY | Facility: CLINIC | Age: 72
End: 2022-10-20

## 2022-10-31 ENCOUNTER — OFFICE VISIT (OUTPATIENT)
Dept: OPHTHALMOLOGY | Facility: CLINIC | Age: 72
End: 2022-10-31
Attending: OPHTHALMOLOGY
Payer: COMMERCIAL

## 2022-10-31 DIAGNOSIS — H35.351 CYSTOID MACULAR EDEMA OF RIGHT EYE: Primary | ICD-10-CM

## 2022-10-31 PROCEDURE — 99207 OCT RETINA SPECTRALIS OU (BOTH EYE): CPT | Mod: 26 | Performed by: OPHTHALMOLOGY

## 2022-10-31 PROCEDURE — 99024 POSTOP FOLLOW-UP VISIT: CPT | Mod: GC | Performed by: OPHTHALMOLOGY

## 2022-10-31 PROCEDURE — 92134 CPTRZ OPH DX IMG PST SGM RTA: CPT | Performed by: OPHTHALMOLOGY

## 2022-10-31 PROCEDURE — G0463 HOSPITAL OUTPT CLINIC VISIT: HCPCS | Mod: 25

## 2022-10-31 RX ORDER — BROMFENAC 1.03 MG/ML
1 SOLUTION/ DROPS OPHTHALMIC DAILY
Qty: 1.7 ML | Refills: 1 | Status: SHIPPED | OUTPATIENT
Start: 2022-10-31

## 2022-10-31 ASSESSMENT — EXTERNAL EXAM - RIGHT EYE: OD_EXAM: NORMAL

## 2022-10-31 ASSESSMENT — CUP TO DISC RATIO: OD_RATIO: 0.2

## 2022-10-31 ASSESSMENT — CONF VISUAL FIELD
OS_NORMAL: 1
OS_INFERIOR_TEMPORAL_RESTRICTION: 0
METHOD: COUNTING FINGERS
OD_SUPERIOR_NASAL_RESTRICTION: 0
OS_INFERIOR_NASAL_RESTRICTION: 0
OD_SUPERIOR_TEMPORAL_RESTRICTION: 0
OD_NORMAL: 1
OS_SUPERIOR_TEMPORAL_RESTRICTION: 0
OD_INFERIOR_NASAL_RESTRICTION: 0
OD_INFERIOR_TEMPORAL_RESTRICTION: 0
OS_SUPERIOR_NASAL_RESTRICTION: 0

## 2022-10-31 ASSESSMENT — TONOMETRY
OS_IOP_MMHG: 15
IOP_METHOD: TONOPEN
OD_IOP_MMHG: 13

## 2022-10-31 ASSESSMENT — SLIT LAMP EXAM - LIDS
COMMENTS: NORMAL
COMMENTS: NORMAL

## 2022-10-31 ASSESSMENT — VISUAL ACUITY
OS_CC: 20/60
OD_SC: 20/300
OD_PH_SC: 20/200
OS_PH_CC: 20/30
OS_PH_CC+: -1
METHOD: SNELLEN - LINEAR
OS_CC+: -2

## 2022-10-31 ASSESSMENT — EXTERNAL EXAM - LEFT EYE: OS_EXAM: NORMAL

## 2022-10-31 NOTE — NURSING NOTE
Chief Complaints and History of Present Illnesses   Patient presents with     Cystoid Macular Edema Follow Up     Chief Complaint(s) and History of Present Illness(es)     Cystoid Macular Edema Follow Up            Laterality: right eye    Severity: moderate    Associated symptoms: dryness, floaters and itching (sometimes).  Negative for eye pain and flashes    Pain scale: 0/10          Comments    Kwaku is here to continue care for Cystoid macular edema of right eye. He states no more bubble RE. Vision RE not good. He sees a new white spot upper right temporally. He says one of the drops he uses, hurts RE. He can't find the bottle now and unsure of the name.     Jett Stallings COT 9:39 AM October 31, 2022

## 2022-10-31 NOTE — PATIENT INSTRUCTIONS
Please  bromday prescription today  Start artificial tear eye drops every 2 hours - we would recommend refresh celluvisc  Start artificial tear ointment 3 times a day    Please follow-up in 1 month with us or at the [facility]

## 2022-10-31 NOTE — PROGRESS NOTES
CC -   Post Op OD RD repair    INTERVAL HISTORY -  POM #2  OD s/p PPV/SBP/C3F8 OD 8/26/22 for mac-off RRD.   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      PAST OCULAR SURGERY  PPV/SBP/C3F8 OD 8/26/22 (DDK)  CE/IOL OD 2020    RETINAL IMAGING:  OCT 09/27/22   OD - 2+ central CME, ->571  OS - retina normal, PHF attached likely      ASSESSMENT & PLAN    # POM #2 OD   - s/p PPV/SBP/C3F8 OD 8/26/22   - repair of mac-off inferior RRD     - retina attached on exam today    - mild pre-retinal fibrosis inferonasal     - IOP OK, no infection   - bothered by small area of symblepharon - continue to monitor    # CME OD   - noted 9/27/22, added ketorolac BID but patient could not tolerate drop stopped after 1 week      - avoid NSAID d/t     - 10/31/22 CME worse   - start PF 4/day      # BELEN keratopathy OD   - 3+ PEE 10/31/22    - start aggressive  PFAT (q2h) and celluvisc, AT autumn 3 times a day     # syneresis OS   - advised S/Sx RD 9/2022    # NS left eye   - monitor     return to clinic: 1-2  month OCT Macula, Optos, VTD      Ace Alvarado MD  Resident Physician, PGY-3  Department of Ophthalmology  09/27/22 1:47 PM          ATTESTATION     Attending Attestation:     Complete documentation of historical and exam elements from today's encounter can be found in the full encounter summary report (not reduplicated in this progress note).  I personally obtained the chief complaint(s) and history of present illness.  I confirmed and edited as necessary the review of systems, past medical/surgical history, family history, social history, and examination findings as documented by others; and I examined the patient myself.  I personally reviewed the relevant tests, images, and reports as documented above.  I personally reviewed the ophthalmic test(s) associated with this  encounter, agree with the interpretation(s) as documented by the resident/fellow, and have edited the corresponding report(s) as necessary.   I formulated and edited as necessary the assessment and plan and discussed the findings and management plan with the patient and family    Lizette Prieto MD, PhD  , Vitreoretinal Surgery  Department of Ophthalmology  [facility]

## 2022-11-07 ENCOUNTER — TELEPHONE (OUTPATIENT)
Dept: OPHTHALMOLOGY | Facility: CLINIC | Age: 72
End: 2022-11-07

## 2022-11-07 NOTE — TELEPHONE ENCOUNTER
BINM for pt to call back directly to schedule appt with Dr. Prieto (likely sometime this week), per Dr. Frazier' request for a recurrent retinal detachment.    GARRISON Fleming 9:37 AM November 7, 2022

## 2022-11-08 ENCOUNTER — TELEPHONE (OUTPATIENT)
Dept: OPHTHALMOLOGY | Facility: CLINIC | Age: 72
End: 2022-11-08

## 2022-11-08 NOTE — TELEPHONE ENCOUNTER
"Called patient. He felt like vision loss occurred immediately after his visit with Dr. Prieto on 10/31. We heard via phone call from [facility] that he presented to them for evaluation and he had recurrent detachment.     He has an appointment scheduled on 11/16/22, but we discussed that this might be too far out in the future since his retina is detached. We will work to make an appointment with Dr. Shaw tomorrow.     Mr Calloway is convinced that his retina re-detached because of the scleral depressed exam he had in clinic by Dr. Prieto. He describes the exam as extremely painful, as though he was \"punched in the eye six times\". He expressed that his eye is still in pain from the exam. He no longer wishes to follow with Dr Prieto, bit is happy to see Dr. Shaw.    Chace Frazier MD  Vitreoretinal Surgical Fellow, PGY6  [facility]    "

## 2022-11-10 ENCOUNTER — OFFICE VISIT (OUTPATIENT)
Dept: OPHTHALMOLOGY | Facility: CLINIC | Age: 72
End: 2022-11-10
Attending: OPHTHALMOLOGY
Payer: COMMERCIAL

## 2022-11-10 ENCOUNTER — HOSPITAL ENCOUNTER (OUTPATIENT)
Facility: CLINIC | Age: 72
End: 2022-11-10
Attending: OPHTHALMOLOGY | Admitting: OPHTHALMOLOGY
Payer: COMMERCIAL

## 2022-11-10 DIAGNOSIS — H33.001 RHEGMATOGENOUS RETINAL DETACHMENT OF RIGHT EYE: Primary | ICD-10-CM

## 2022-11-10 PROCEDURE — 99024 POSTOP FOLLOW-UP VISIT: CPT | Mod: GC | Performed by: OPHTHALMOLOGY

## 2022-11-10 PROCEDURE — 92134 CPTRZ OPH DX IMG PST SGM RTA: CPT | Performed by: OPHTHALMOLOGY

## 2022-11-10 PROCEDURE — 99207 FUNDUS PHOTOS OU (BOTH EYES): CPT | Mod: 26 | Performed by: OPHTHALMOLOGY

## 2022-11-10 PROCEDURE — G0463 HOSPITAL OUTPT CLINIC VISIT: HCPCS | Mod: 25

## 2022-11-10 PROCEDURE — 99207 OCT RETINA SPECTRALIS OU (BOTH EYE): CPT | Mod: 26 | Performed by: OPHTHALMOLOGY

## 2022-11-10 PROCEDURE — 92250 FUNDUS PHOTOGRAPHY W/I&R: CPT | Performed by: OPHTHALMOLOGY

## 2022-11-10 ASSESSMENT — REFRACTION_WEARINGRX
OS_AXIS: 073
OS_CYLINDER: +1.75
OS_SPHERE: -8.50
OD_SPHERE: PLANO

## 2022-11-10 ASSESSMENT — EXTERNAL EXAM - LEFT EYE: OS_EXAM: NORMAL

## 2022-11-10 ASSESSMENT — VISUAL ACUITY
METHOD: SNELLEN - LINEAR
OS_CC: 20/30
OS_CC+: -2

## 2022-11-10 ASSESSMENT — EXTERNAL EXAM - RIGHT EYE: OD_EXAM: NORMAL

## 2022-11-10 ASSESSMENT — CONF VISUAL FIELD
OD_SUPERIOR_NASAL_RESTRICTION: 1
OD_SUPERIOR_TEMPORAL_RESTRICTION: 1
OD_INFERIOR_TEMPORAL_RESTRICTION: 1

## 2022-11-10 ASSESSMENT — SLIT LAMP EXAM - LIDS
COMMENTS: NORMAL
COMMENTS: NORMAL

## 2022-11-10 ASSESSMENT — TONOMETRY
IOP_METHOD: TONOPEN
OD_IOP_MMHG: 15

## 2022-11-10 ASSESSMENT — CUP TO DISC RATIO: OD_RATIO: 0.2

## 2022-11-10 NOTE — NURSING NOTE
Chief Complaints and History of Present Illnesses   Patient presents with     Retinal Detachment Evaluation     Had follow up visit last week after appointment shade and vision change   RE feels sore, total loss of vision  Refresh 50 drops day RE  Keyla JI 12:19 PM November 10, 2022     Oint every day RE         Chief Complaint(s) and History of Present Illness(es)     Retinal Detachment Evaluation            Laterality: right eye    Associated symptoms: shade and peripheral vision loss.  Negative for photophobia and eye pain    Comments: Had follow up visit last week after appointment shade and vision change   RE feels sore, total loss of vision  Refresh 50 drops day RE  Keyla De Souza [facility] 12:19 PM November 10, 2022     Oint every day RE

## 2022-11-10 NOTE — PROGRESS NOTES
CC -   Post Op OD RD repair right eye 8/26/22    INTERVAL HISTORY -  OD s/p PPV/SBP/C3F8 OD 8/26/22 for mac-off RRD.   Reports that his vision decreased since his 10/31/22 appointment. He first noticed finger-like projections immediately after followed by decreased vision. 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  I explained that with surgery there was approximately a 70 to 80 percent chance of success and that the surgery might fail due to formation of fibrosis or other postoperative problems.  I explained that if the surgery failed, additional surgeries might be needed. I explained that retinal detachments cause vision loss and that even with a successful result from the surgery, the vision might not return to its prior level.  I explained that if there were subsequent re-detachments, even more vision might be lost.  I explained that with a gas bubble in the eye, a particular head position after surgery including face-down might be necessary for a few weeks after surgery.  I also explained that airplane travel or high altitudes would have to be avoided for up to three months after  surgery.  I explained that an oil bubble could be placed instead, and that it would not require such strict positioning or travel restrictions. However, an oil bubble would require further surgeries to be removed.  I explained that with a scleral buckle around the eye, double vision may develop after surgery.  This double vision usually resolves, but it might require either special glasses, further surgeries, or could even be intractable and permanent. I explained there was a small chance I might have to remove their lens during my surgery. After explaining all risks, benefits and alternatives of the surgery including but not limited to endophthalmitis, retina detachment and cataract the patient elected to proceed.   Also explained about guarded visual prognosis because of  Recurrent Retinal detachment  With proliferative vitreoretinopathy       #  s/p PPV/SBP/C3F8 OD 8/26/22 for repair of mac-off inferior RRD  Now with recurrent Retinal detachment  As above   - IOP OK, no infection   - bothered by small area of symblepharon - continue to monitor      # CME OD   - noted 9/27/22, added ketorolac BID but patient could not tolerate drop stopped after 1 week   - avoid NSAID d/t Dry eye syndrome    - 10/31/22 CME worse    # BELEN keratopathy OD   - 3+ PEE 10/31/22    - recommend aggressive  PFAT (q2h) and celluvisc, AT autumn 3 times a day     # syneresis OS   - advised S/Sx RD 9/2022    # NS left eye   - monitor     return to clinic: post-op    Irwin Whitehead MD, MPH  Vitreoretinal Surgery Fellow PGY-5  [facility]    ~~~~~~~~~~~~~~~~~~~~~~~~~~~~~~~~~~   Complete documentation of historical and exam elements from today's encounter can be found in the full encounter summary report (not reduplicated in this progress note).  I personally obtained the chief complaint(s) and history of present illness.  I confirmed and edited as necessary the review of systems, past medical/surgical history, family history, social  history, and examination findings as documented by others; and I examined the patient myself.  I personally reviewed the relevant tests, images, and reports as documented above.  I personally reviewed the ophthalmic test(s) associated with this encounter, agree with the interpretation(s) as documented by the resident/fellow, and have edited the corresponding report(s) as necessary.   I formulated and edited as necessary the assessment and plan and discussed the findings and management plan with the patient and family    Qing Shaw MD  Professor of Ophthalmology  Vitreo-Retinal surgeon   Department of Ophthalmology and Visual Neurosciences   [facility]  Phone: [phone]   Fax: [phone]

## 2022-11-11 ENCOUNTER — TELEPHONE (OUTPATIENT)
Dept: OPHTHALMOLOGY | Facility: CLINIC | Age: 72
End: 2022-11-11

## 2022-11-11 NOTE — TELEPHONE ENCOUNTER
Returned Kwaku's call and I left a voicemail with callback # [phone]. I let Kwaku know we are not able to add his case currently to the surgery board for Monday but we may be able to do a same day add on following Dr. Shaw's clinic depending on how the OR is running for the day.    I encouraged [facility]. I explained if he can and then we will be able to proceed with surgery at any point once ready and OR space becomes available.    I also left the call center number in case he needs medical assistance in the mean time to reach triage. [phone].

## 2022-11-13 ENCOUNTER — TELEPHONE (OUTPATIENT)
Dept: OPHTHALMOLOGY | Facility: CLINIC | Age: 72
End: 2022-11-13

## 2022-11-14 ENCOUNTER — TELEPHONE (OUTPATIENT)
Dept: OPHTHALMOLOGY | Facility: CLINIC | Age: 72
End: 2022-11-14

## 2022-11-14 NOTE — TELEPHONE ENCOUNTER
Called patient to schedule surgery for 11/15. He expressed that he has had difficulty scheduling surgery because he does not trust the [facility] because of his bad experience with the recurrent retinal detachment after his exam on 10/31.     I explained that his visual prognosis may worsen if there I too much delay between his detachment and its repair. I strongly recommended surgery this week and warned that visual recovery might not be as good if surgery is delayed beyond that.    I suggested that he could follow up with [facility] if he would like care from a different organization, since he already saw them on 11/4. He gave me permission to call one of their doctors to provide sign out.     I told him that he was responsible for scheduling his own appointment with them. I also explained that we would not contact him further for his care, but we would be happy to help him to the best of our abilities if he requested help.    I called Benito Phillips at [facility] (their fellow who initially contacted me about this patient) and explained his concerns and let him know to expect Mr. Calloway to call for evaluation.    Shortly after my call with Mr. Calloway, he called back and asked for records to be faxed to [facility]. I provided the fax information to our staff to send his most recent records.    Chace Frazier MD  Vitreoretinal Surgical Fellow, PGY6  [facility]

## 2022-12-14 DIAGNOSIS — H35.351 CYSTOID MACULAR EDEMA OF RIGHT EYE: Primary | ICD-10-CM

## 2023-03-14 ENCOUNTER — TELEPHONE (OUTPATIENT)
Dept: OPHTHALMOLOGY | Facility: CLINIC | Age: 73
End: 2023-03-14

## 2023-03-14 NOTE — TELEPHONE ENCOUNTER
"Mr. Calloway walked into the clinic today and asked the  if he could speak with Dr. Prieto as he had a question for the doctor. I asked the  staff what the question was, and she was not aware. I told her to have the pt come back to our clinic area to discuss his question as the  staff mentioned something about images, so I wanted to have my computer within reach if he had any questions related to his imaging that was completed.    Mr. Calloway asked why Dr. Prieto had poked him in the eye so much during his exam that he caused a retinal detachment. I explained the purpose of a scleral depressed examination, which is what would have been performed at his visit, and told him this is to look for any signs of any tears or detachments, not to cause them. The pt was adamant that the doctor was poking him in the front of his eye and had pushed so hard that the pt was able to see the \"fingerprint on the scan of his retina at an outside facility\". I informed the pt that if he is seeing any eye doctor who is telling him information like this, that is 100% not true. He then stated the doctor didn't tell him that, but he would like to know specifically why Dr. Prieto made the statement that the examination he was performing \"would not cause a detachment as there was too much scarring for that to happen\" (pt had previous laser treatment done to the eye in question).    This is the bottom line that the pt would like to have addressed with Dr. Prieto via phone: \"Why did the doctor make the statement that the examination being performed would not cause a retinal detachment since there was so much scarring?\"    I told the pt I would route a message to Dr. Prieto and have him reach out - pt stated it would be okay to leave a voicemail.    GARRISON Fleming 4:45 PM March 14, 2023   "

## 2023-03-15 NOTE — TELEPHONE ENCOUNTER
called patient today at his number in the chart and spoke with him. Explained that he had scleral depressed exam to evaluate pre-retinal fibrosis seen inferonasally and that while uncomfortable, the depression did not cause his retina to re-detach.  Explained it is a standard exam technique and is safe. I suspect there was a tender area around his buckle inferonalsaly which made the inferonasal depression, which typically is uncomfortable in most patients anyway, particularly uncomfortable for him. We discussed his diplopia and I recommended he see a strabismus specialist once his retina is stable without oil.  I asked him to keep in contact and update us on his progress.

## 2024-06-20 ENCOUNTER — TRANSCRIBE ORDERS (OUTPATIENT)
Dept: OTHER | Age: 74
End: 2024-06-20

## 2024-06-20 ENCOUNTER — TRANSFERRED RECORDS (OUTPATIENT)
Dept: HEALTH INFORMATION MANAGEMENT | Facility: CLINIC | Age: 74
End: 2024-06-20

## 2024-06-20 DIAGNOSIS — H20.11 CHRONIC UVEITIS OF RIGHT EYE: Primary | ICD-10-CM

## 2024-07-15 ENCOUNTER — TRANSFERRED RECORDS (OUTPATIENT)
Dept: HEALTH INFORMATION MANAGEMENT | Facility: CLINIC | Age: 74
End: 2024-07-15

## 2024-10-02 ENCOUNTER — TELEPHONE (OUTPATIENT)
Dept: OPHTHALMOLOGY | Facility: CLINIC | Age: 74
End: 2024-10-02

## 2024-10-02 NOTE — TELEPHONE ENCOUNTER
[facility] Call Center    Phone Message    May a detailed message be left on voicemail: yes     Reason for Call: Other: pt requesting a call back to discuss details of upcoming appointment on 11/13/24 with Dr Interiano pt is also wanting to know will he be able to drive alone following the appt Please contact pt to discuss details and concerns Thank you     Action Taken: Message routed to:  [facility] eye    Travel Screening: Not Applicable     Date of Service:

## 2024-10-02 NOTE — TELEPHONE ENCOUNTER
Called and LVM     I will try again later today     Echo Smith Communication Facilitator on 10/2/2024 at 1:48 PM

## 2024-10-03 NOTE — TELEPHONE ENCOUNTER
Called x 4 - LVM    Called 10/2 @ 146 pm and 342 pm - LVM     Called 10/3 @ 1041 AM x2 - LVM    Echo Smith Communication Facilitator on 10/3/2024 at 10:44 AM

## 2024-10-08 ENCOUNTER — TRANSCRIBE ORDERS (OUTPATIENT)
Dept: OTHER | Age: 74
End: 2024-10-08

## 2024-10-08 DIAGNOSIS — H16.231 NEUROTROPHIC KERATOPATHY OF RIGHT EYE: Primary | ICD-10-CM

## 2024-10-30 ENCOUNTER — OFFICE VISIT (OUTPATIENT)
Dept: OPHTHALMOLOGY | Facility: CLINIC | Age: 74
End: 2024-10-30
Attending: OPHTHALMOLOGY
Payer: COMMERCIAL

## 2024-10-30 DIAGNOSIS — H25.12 AGE-RELATED NUCLEAR CATARACT, LEFT EYE: ICD-10-CM

## 2024-10-30 DIAGNOSIS — H20.11 CHRONIC ANTERIOR UVEITIS OF RIGHT EYE: Primary | ICD-10-CM

## 2024-10-30 DIAGNOSIS — Z86.69 HISTORY OF RETINAL DETACHMENT: ICD-10-CM

## 2024-10-30 DIAGNOSIS — H16.231 NEUROTROPHIC KERATOPATHY OF RIGHT EYE: ICD-10-CM

## 2024-10-30 DIAGNOSIS — H53.2 DOUBLE VISION: ICD-10-CM

## 2024-10-30 PROCEDURE — 99211 OFF/OP EST MAY X REQ PHY/QHP: CPT | Performed by: OPHTHALMOLOGY

## 2024-10-30 PROCEDURE — 92250 FUNDUS PHOTOGRAPHY W/I&R: CPT | Performed by: OPHTHALMOLOGY

## 2024-10-30 PROCEDURE — 99214 OFFICE O/P EST MOD 30 MIN: CPT | Mod: GC | Performed by: OPHTHALMOLOGY

## 2024-10-30 RX ORDER — OMEGA-3 FATTY ACIDS/FISH OIL 300-1000MG
CAPSULE ORAL
COMMUNITY

## 2024-10-30 RX ORDER — CARBOXYMETHYLCELLULOSE SODIUM 5 MG/ML
1 SOLUTION/ DROPS OPHTHALMIC
COMMUNITY

## 2024-10-30 ASSESSMENT — CONF VISUAL FIELD
METHOD: COUNTING FINGERS
OS_SUPERIOR_NASAL_RESTRICTION: 0
OS_NORMAL: 1
OS_INFERIOR_TEMPORAL_RESTRICTION: 0
OD_SUPERIOR_TEMPORAL_RESTRICTION: 2
OS_INFERIOR_NASAL_RESTRICTION: 0
OS_SUPERIOR_TEMPORAL_RESTRICTION: 0
OD_INFERIOR_TEMPORAL_RESTRICTION: 1

## 2024-10-30 ASSESSMENT — REFRACTION_WEARINGRX
OS_AXIS: 077
OD_SPHERE: PLANO
OS_CYLINDER: +1.25
OD_AXIS: 056
OD_CYLINDER: +0.25
OS_SPHERE: -7.50
SPECS_TYPE: SVL

## 2024-10-30 ASSESSMENT — EXTERNAL EXAM - RIGHT EYE: OD_EXAM: NORMAL

## 2024-10-30 ASSESSMENT — VISUAL ACUITY
OS_PH_CC: 20/80
METHOD: SNELLEN - LINEAR
OS_PH_CC+: -1
CORRECTION_TYPE: GLASSES
OS_CC: 20/100
OS_CC+: -1

## 2024-10-30 ASSESSMENT — CUP TO DISC RATIO
OD_RATIO: 0.4?
OS_RATIO: 0.5

## 2024-10-30 ASSESSMENT — SLIT LAMP EXAM - LIDS
COMMENTS: NORMAL
COMMENTS: PROTECTIVE PTOSIS

## 2024-10-30 ASSESSMENT — TONOMETRY
OS_IOP_MMHG: 18
IOP_METHOD: TONOPEN
OD_IOP_MMHG: 11

## 2024-10-30 ASSESSMENT — EXTERNAL EXAM - LEFT EYE: OS_EXAM: NORMAL

## 2024-10-30 NOTE — PROGRESS NOTES
Chief Complaint/Presenting Concern: Uveitis evaluation    History of Present Illness:   Kwaku Calloway is a 74 year old patient who presents for evaluation of chronic inflammation of the right eye from Dr. Winnie Collins.    Mr. 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 He has been using drops and has not ongoing issues. When moving the eye around he can see around the silicone oil bubble. The left eye is not seeing as well up close and Mr. Calloway is wondering if prior manipulations to the left eye     Mr. Calloway wonders about  binocular diplopia as well as the lack of clarity and the need for such frequent drops. Mr. Calloway notes that by tipping his head towards the floor he will see the oil bubble and discomfort right eye.     Additional Ocular History:   Retinal detachment right eye initially treated with laser then needed vitrectomy and then gas bubble and subsequently silicone oil and scleral buckle. 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 He believes he has been taking plaquenil for more than 5 years. He reports taking 100 mg daily most days. [facility]  No other medical conditions    Previously worked on [employer]    Relevant Review of Systems: No systemic symptoms, joints feeling good.     Laboratory Testing: None recently relevant     Current eye related medications: Prednisolone every hour right eye, Artificial tears hourly while awake,  Lutein and zeaxanthin, and AT once an hour while awake     Retina/Uveitis Imaging:  Fundus  photos October 30, 2024  right eye: linear central media opacity, optic disc hyperemic, macula folds, vessels tortuous, scleral buckle indentation with nasal, inferior and temporal chorioretinal scarring   left eye: mild central media opacity, disc not well visualized, periphery flat, vessels attenuated, temporal periphery pigmented lesion    Assessment:    1. Chronic anterior uveitis of right eye (Primary)  Without active inflammation on hourly steroid drops    2. Neurotrophic keratopathy of right eye  Resolved with resultant corneal striae but no epithelial defect    3. History of retinal detachment-right eye  Attached peripherally    4. Double vision right eye  Perhaps related to scleral buckle    5. Age-related nuclear cataract, left eye  Visually significant    Plan/Recommendations:  Discussed findings with patient.  In regard to the chronic anterior uveitis of the right eye, this is currently inactive on the frequency of steroid drops.  We discussed interval reduction to see if the inflammation can be controlled on less frequent drops without the need for escalation of therapy  The peripheral retina in the right eye remains attached over the scleral buckle along with 1 small residual silicone oil bubble.  We discussed that the presence of the scleral buckle is likely helping to keep the retina in place but may be contributing partially to the double vision.  We will defer to Dr. Gonzalez regarding long-term scleral buckle plans  The corneal epithelium is irregular although there is no active infiltrate.  There are stellate folds but no keratic precipitates.  Prior notes detail neurotrophic keratopathy and the patient does recall having an infection on the cornea previously.  No antibiotic drops are needed at this time  Eye pressure is 11 and 18.  This is normal and no treatment is needed  The left eye is doing well without inflammation or retinal detachment. Reasonable to monitor the cataract for now  Recommend  additional diagnostic testing: None at this time  Continue prednisolone drop but reduce to 4x/day in the right eye  Given no active inflammation, no specific need for any other treatments at this time   Okay to take Plaquenil 100 mg daily  If Dr. Gonzalez considers surgery, we can see you again to discuss some next steps    RTC Dr. Brennan Gonzalez next week to consider OCT     Here PRN.    Ryan Zhong MD  Resident Physician, PGY-3  Department of Ophthalmology     Attending Physician Attestation:  Complete documentation of historical and exam elements from today's encounter can be found in the full encounter summary report (not reduplicated in this progress note). I reviewed the chief complaint(s) and history of present illness, and  confirmed and edited as necessary the review of systems, past medical/surgical history, family history, social history, and examination findings as documented by others and the treating Resident or Fellow Physician.    I examined the patient myself, discussed the findings, reviewed all ancillary testing data and modified these results and reports along with the assessment and plan with the Treating Resident or Fellow Physician. I agree with the note as detailed above.   Paramjit Interiano M.D.  Uveitis and Medical Retina  October 30, 2024

## 2024-10-30 NOTE — PATIENT INSTRUCTIONS
Continue prednisolone drop but reduce to 4x/day in the right eye  Given no active inflammation, no specific need for any other treatments at this time   Okay to take Plaquenil 100 mg daily  If Dr. Gonzalez considers surgery, we can see you again to discuss some next steps

## 2024-10-30 NOTE — LETTER
10/30/2024       RE: Kwaku Calloway  [address]  [facility] [address]     Dear Colleague,    Thank you for referring your patient, Kwaku Calloway, to the [facility] [place] at [facility]. Please see a copy of my visit note below.    Chief Complaint/Presenting Concern: Uveitis evaluation    History of Present Illness:   Kwaku Calloway is a 74 year old patient who presents for evaluation of chronic inflammation of the right eye from Dr. Winnie Collins.    Mr. 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 He has been using drops and has not ongoing issues. When moving the eye around he can see around the silicone oil bubble. The left eye is not seeing as well up close and Mr. Calloway is wondering if prior manipulations to the left eye     Mr. Calloway wonders about  binocular diplopia as well as the lack of clarity and the need for such frequent drops. Mr. Calloway notes that by tipping his head towards the floor he will see the oil bubble and discomfort right eye.     Additional Ocular History:   Retinal detachment right eye initially treated with laser then needed vitrectomy and then gas bubble and subsequently silicone oil and scleral buckle. 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 He believes he has been taking plaquenil for more than 5 years. He reports taking 100 mg daily most days. [facility]  No other medical conditions    Previously worked on [employer]    Relevant Review of  Systems: No systemic symptoms, joints feeling good.     Laboratory Testing: None recently relevant     Current eye related medications: Prednisolone every hour right eye, Artificial tears hourly while awake,  Lutein and zeaxanthin, and AT once an hour while awake     Retina/Uveitis Imaging:  Fundus photos October 30, 2024  right eye: linear central media opacity, optic disc hyperemic, macula folds, vessels tortuous, scleral buckle indentation with nasal, inferior and temporal chorioretinal scarring   left eye: mild central media opacity, disc not well visualized, periphery flat, vessels attenuated, temporal periphery pigmented lesion    Assessment:    1. Chronic anterior uveitis of right eye (Primary)  Without active inflammation on hourly steroid drops    2. Neurotrophic keratopathy of right eye  Resolved with resultant corneal striae but no epithelial defect    3. History of retinal detachment-right eye  Attached peripherally    4. Double vision right eye  Perhaps related to scleral buckle    5. Age-related nuclear cataract, left eye  Visually significant    Plan/Recommendations:  Discussed findings with patient.  In regard to the chronic anterior uveitis of the right eye, this is currently inactive on the frequency of steroid drops.  We discussed interval reduction to see if the inflammation can be controlled on less frequent drops without the need for escalation of therapy  The peripheral retina in the right eye remains attached over the scleral buckle along with 1 small residual silicone oil bubble.  We discussed that the presence of the scleral buckle is likely helping to keep the retina in place but may be contributing partially to the double vision.  We will defer to Dr. Gonzalez regarding long-term scleral buckle plans  The corneal epithelium is irregular although there is no active infiltrate.  There are stellate folds but no keratic precipitates.  Prior notes detail neurotrophic keratopathy and the patient  does recall having an infection on the cornea previously.  No antibiotic drops are needed at this time  Eye pressure is 11 and 18.  This is normal and no treatment is needed  The left eye is doing well without inflammation or retinal detachment. Reasonable to monitor the cataract for now  Recommend additional diagnostic testing: None at this time  Continue prednisolone drop but reduce to 4x/day in the right eye  Given no active inflammation, no specific need for any other treatments at this time   Okay to take Plaquenil 100 mg daily  If Dr. Gonzalez considers surgery, we can see you again to discuss some next steps    RTC Dr. Brennan Gonzalez next week to consider OCT     Here PRN.    Ryan Zhong MD  Resident Physician, PGY-3  Department of Ophthalmology     Attending Physician Attestation:  Complete documentation of historical and exam elements from today's encounter can be found in the full encounter summary report (not reduplicated in this progress note). I personally obtained the chief complaint(s) and history of present illness. I confirmed and edited as necessary the review of systems, past medical/surgical history, family history, social history, and examination findings as documented by others; and I examined the patient myself. I personally reviewed the relevant tests, images, and reports as documented above. I formulated and edited as necessary the assessment and plan and discussed the findings and management plan with the patient and family.  Paramjit Interiano MD.      Again, thank you for allowing me to participate in the care of your patient.      Sincerely,    Paramjit Interiano MD  Uveitis and Medical Retina    Department of Ophthalmology & Visual Neurosciences  [facility]

## 2024-10-30 NOTE — NURSING NOTE
"Chief Complaints and History of Present Illnesses   Patient presents with    Retinal Evaluation     s/p PPV/SBP/C3F8 OD 8/26/22 for repair of mac-off inferior RRD     Chief Complaint(s) and History of Present Illness(es)       Retinal Evaluation              Laterality: both eyes    Onset: gradual    Onset: years ago    Pain scale: 0/10    Comments: s/p PPV/SBP/C3F8 OD 8/26/22 for repair of mac-off inferior RRD              Comments    Kwaku Calloway is being seen for a consult today by the request of Dr. Collins for evaluation of possible uveitis both eyes.    Patient states noticing diplopia with right eye, with a darkened area that is bothersome. Has to close right eye while walking. Taking Prednisolone once every hour while awake right eye. Also taking Lutein and zeaxanthin, and AT once an hour while awake, with still having irritation around the sides of right eye, where SB is. Noticing sharp pain right upper eyelid. Patient has noticed decreased vision left eye, used to be monovision and is having difficulty adjusting to not seeing well after all the procedures and history of ocular issues. Continual \"dim glow and green propellers spinning\" right eye. Was told of a residual bubble still present in right eye following ocular surgery.     Some redness front of eyes, with light sensitivity, both eyes.     Will be having a consult to possibly remove SB right eye.    No DM.    Carolyn Arenas on 10/30/2024 at 1:54 PM                             "

## 2025-04-25 ENCOUNTER — TRANSFERRED RECORDS (OUTPATIENT)
Dept: HEALTH INFORMATION MANAGEMENT | Facility: CLINIC | Age: 75
End: 2025-04-25

## (undated) DEVICE — EYE PROBE ESU DIATHERMY DSP 25GA 339.21

## (undated) DEVICE — TAPE MICROPORE 1"X1.5YD 1530S-1

## (undated) DEVICE — GLOVE PROTEXIS MICRO 7.5  2D73PM75

## (undated) DEVICE — SU PLAIN 6-0 TG140-8 18" 1735G

## (undated) DEVICE — LINEN TOWEL PACK X5 5464

## (undated) DEVICE — EYE KIT AUTO GAS FOR CONSTELLATION 8065751014

## (undated) DEVICE — SOL WATER IRRIG 500ML BOTTLE 2F7113

## (undated) DEVICE — SU ETHILON 5-0 RD-1DA 18" 749G

## (undated) DEVICE — EYE NDL RETROBULBAR ATKINSON 25GA 1.5" 581637

## (undated) DEVICE — EYE DRSG PAD OVAL

## (undated) DEVICE — EYE CANN SOFT TIP 25GA FOR VALVED SET 8065149530

## (undated) DEVICE — EYE CANN IRR 27GA ANTERIOR CHAMBER 581280

## (undated) DEVICE — EYE SHIELD PLASTIC

## (undated) DEVICE — DRAPE MICRO 41X81"

## (undated) DEVICE — SU VICRYL 7-0 TG140-8DA 18" J546G

## (undated) DEVICE — EYE PROBE LASER 25GA FLEX RFID 8065751114

## (undated) DEVICE — SU SILK 2-0 TIE 12X30" A305H

## (undated) DEVICE — EYE PACK 25GA CONSTELLATION 10,000 CPM PPK9380-02

## (undated) DEVICE — PACK VITRECTOMY/RET CUSTOM ASC PQ15VRU12

## (undated) DEVICE — SU VICRYL 6-0 S-24DA 12" J552G

## (undated) DEVICE — ESU CORD BIPOLAR GREEN 10-4000

## (undated) DEVICE — GLOVE PROTEXIS MICRO 7.0  2D73PM70

## (undated) RX ORDER — FENTANYL CITRATE 50 UG/ML
INJECTION, SOLUTION INTRAMUSCULAR; INTRAVENOUS
Status: DISPENSED
Start: 2022-08-26